# Patient Record
(demographics unavailable — no encounter records)

---

## 2025-01-13 NOTE — HISTORY OF PRESENT ILLNESS
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

## 2025-01-13 NOTE — PLAN
[TextEntry] : Patient do understand that even this was provoked DVT/PE we cannot guarantee when he stop the Eliquis that he is not can have recurrence of DVT PE, Patient want to stop Eliquis he understand the risk For now blood for D-dimer ordered Lower extremity venous duplex ordered VQ scan ordered After above workup if all within normal will discussed with patient and stop Eliquis and put him on baby aspirin Told patient if he stop Eliquis if he need to go for surgery or have any travel will recommend to be placed on Eliquis 36 hours.